# Patient Record
(demographics unavailable — no encounter records)

---
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
## 2024-12-24 NOTE — PHYSICAL EXAM
[de-identified] : - Constitutional: This is a male in no obvious distress.   - Psych: Patient is alert and oriented to person, place and time.  Patient has a normal mood and affect. - Cardiovascular: Normal pulses throughout the upper extremities.  No significant varicosities are noted in the upper extremities.  - Neuro: Strength and sensation are intact throughout the upper extremities.  Patient has normal coordination. - Respiratory:  Patient exhibits no evidence of shortness of breath or difficulty breathing. - Skin: No rashes, lesions, or other abnormalities are noted in the upper extremities.  ---  Examination of his left wrist demonstrates

## 2024-12-24 NOTE — DISCUSSION/SUMMARY
[FreeTextEntry1] : He has findings consistent with to his left wrist  I had a discussion regarding today's visit, the prognosis of this diagnosis and treatment recommendations and options.  At this time, I recommended   The patient has agreed to this plan of management and has expressed full understanding.  All questions were fully answered to the patient's satisfaction.  My cumulative time spent on this patient's visit included: Preparation for the visit, review of the medical records, review of pertinent diagnostic studies, examination and counseling of the patient on the above diagnosis, treatment plan and prognosis, orders of diagnostic tests, medications and/or appropriate procedures and documentation in the medical records of today's visit.
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
## 2025-01-02 NOTE — PHYSICAL EXAM
[de-identified] : - Constitutional: This is a male in no obvious distress.   - Psych: Patient is alert and oriented to person, place and time.  Patient has a normal mood and affect. - Cardiovascular: Normal pulses throughout the upper extremities.  No significant varicosities are noted in the upper extremities.  - Neuro: Strength and sensation are intact throughout the upper extremities.  Patient has normal coordination.  ---  Examination of his left thumb demonstrates swelling along the MCP joint ulnarly.  He is tender along the ulnar collateral ligament.  He has some laxity to stress testing of the MCP joint ulnar collateral ligament.  There is no swelling or tenderness along the MCP joint radial collateral ligament.  He is neurovascularly intact distally. [de-identified] : An MRI of his left thumb dated 11/5/2024 demonstrated: Discontinuity and distal attachment of ulnar collateral ligament first metacarpophalangeal joint. The presence of a Stener lesion cannot be excluded.  The MRI films were not available for my review today, but this was the report.  AP, lateral and oblique radiographs of his left thumb demonstrate no evidence of fracture, dislocation, or arthritis.

## 2025-01-02 NOTE — ADDENDUM
[FreeTextEntry1] : This note was written by Hector Gudino on 01/02/2025 acting solely as a scribe for Dr. Shaun Alcaraz.   All medical record entries made by the Scribe were at my, Dr. Shaun Alcaraz, direction and personally dictated by me on 01/02/2025. I have personally reviewed the chart and agree that the record accurately reflects my personal performance of the history, physical exam, assessment and plan.

## 2025-01-02 NOTE — PHYSICAL EXAM
[de-identified] : - Constitutional: This is a male in no obvious distress.   - Psych: Patient is alert and oriented to person, place and time.  Patient has a normal mood and affect. - Cardiovascular: Normal pulses throughout the upper extremities.  No significant varicosities are noted in the upper extremities.  - Neuro: Strength and sensation are intact throughout the upper extremities.  Patient has normal coordination.  ---  Examination of his left thumb demonstrates swelling along the MCP joint ulnarly.  He is tender along the ulnar collateral ligament.  He has some laxity to stress testing of the MCP joint ulnar collateral ligament.  There is no swelling or tenderness along the MCP joint radial collateral ligament.  He is neurovascularly intact distally. [de-identified] : An MRI of his left thumb dated 11/5/2024 demonstrated: Discontinuity and distal attachment of ulnar collateral ligament first metacarpophalangeal joint. The presence of a Stener lesion cannot be excluded.  The MRI films were not available for my review today, but this was the report.  AP, lateral and oblique radiographs of his left thumb demonstrate no evidence of fracture, dislocation, or arthritis.